# Patient Record
Sex: FEMALE | Race: WHITE | NOT HISPANIC OR LATINO | Employment: PART TIME | URBAN - METROPOLITAN AREA
[De-identification: names, ages, dates, MRNs, and addresses within clinical notes are randomized per-mention and may not be internally consistent; named-entity substitution may affect disease eponyms.]

---

## 2023-08-02 ENCOUNTER — HOSPITAL ENCOUNTER (OUTPATIENT)
Dept: MRI IMAGING | Facility: HOSPITAL | Age: 38
Discharge: HOME | End: 2023-08-02
Admitting: PSYCHIATRY & NEUROLOGY
Payer: COMMERCIAL

## 2023-08-02 DIAGNOSIS — R47.02: ICD-10-CM

## 2023-08-02 DIAGNOSIS — R41.3 MEMORY IMPAIRMENT: ICD-10-CM

## 2023-08-02 PROCEDURE — 70551 MRI BRAIN STEM W/O DYE: CPT

## 2023-10-13 ENCOUNTER — TELEPHONE (OUTPATIENT)
Dept: NEUROLOGY | Facility: CLINIC | Age: 38
End: 2023-10-13
Payer: COMMERCIAL

## 2023-10-13 DIAGNOSIS — R41.3 MEMORY IMPAIRMENT: Primary | ICD-10-CM

## 2023-10-13 DIAGNOSIS — R47.02: ICD-10-CM

## 2023-10-13 NOTE — TELEPHONE ENCOUNTER
Dr. Johnson,    Patient would like a new neuropsych referral placed to go to [facility]. She stated that [facility]y Carvalho is not in network with her insurance. I did explain that [facility] is very booked out and she voiced understanding.    Thank you,  Ashanti

## 2023-11-03 ENCOUNTER — TELEPHONE (OUTPATIENT)
Dept: NEUROLOGY | Facility: CLINIC | Age: 38
End: 2023-11-03

## 2023-11-03 ENCOUNTER — OFFICE VISIT (OUTPATIENT)
Dept: NEUROLOGY | Facility: CLINIC | Age: 38
End: 2023-11-03
Payer: COMMERCIAL

## 2023-11-03 ENCOUNTER — LAB (OUTPATIENT)
Dept: LAB | Facility: HOSPITAL | Age: 38
End: 2023-11-03
Payer: COMMERCIAL

## 2023-11-03 VITALS
HEIGHT: 67 IN | WEIGHT: 120 LBS | DIASTOLIC BLOOD PRESSURE: 72 MMHG | HEART RATE: 72 BPM | BODY MASS INDEX: 18.83 KG/M2 | SYSTOLIC BLOOD PRESSURE: 120 MMHG | OXYGEN SATURATION: 100 %

## 2023-11-03 DIAGNOSIS — R47.02: ICD-10-CM

## 2023-11-03 DIAGNOSIS — R41.3 MEMORY IMPAIRMENT: Primary | ICD-10-CM

## 2023-11-03 DIAGNOSIS — R41.3 MEMORY IMPAIRMENT: ICD-10-CM

## 2023-11-03 PROCEDURE — 99214 OFFICE O/P EST MOD 30 MIN: CPT | Performed by: PSYCHIATRY & NEUROLOGY

## 2023-11-03 PROCEDURE — 36415 COLL VENOUS BLD VENIPUNCTURE: CPT

## 2023-11-03 RX ORDER — PHENOL 1.4 %
10 AEROSOL, SPRAY (ML) MUCOUS MEMBRANE
COMMUNITY
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
## 2023-11-06 DIAGNOSIS — R41.3 MEMORY IMPAIRMENT: Primary | ICD-10-CM

## 2023-11-06 DIAGNOSIS — R47.02: ICD-10-CM

## 2024-06-27 ENCOUNTER — HOSPITAL ENCOUNTER (EMERGENCY)
Facility: HOSPITAL | Age: 39
Discharge: HOME | End: 2024-06-27
Attending: EMERGENCY MEDICINE
Payer: COMMERCIAL

## 2024-06-27 VITALS
OXYGEN SATURATION: 100 % | TEMPERATURE: 98.8 F | BODY MASS INDEX: 18.21 KG/M2 | DIASTOLIC BLOOD PRESSURE: 97 MMHG | HEART RATE: 69 BPM | RESPIRATION RATE: 14 BRPM | WEIGHT: 116 LBS | HEIGHT: 67 IN | SYSTOLIC BLOOD PRESSURE: 137 MMHG
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
## 2024-06-27 DIAGNOSIS — L98.9 SKIN LESION: Primary | ICD-10-CM

## 2024-06-27 PROCEDURE — 99283 EMERGENCY DEPT VISIT LOW MDM: CPT

## 2024-06-27 RX ORDER — DOXYCYCLINE 100 MG/1
100 CAPSULE ORAL ONCE
Status: COMPLETED | OUTPATIENT
Start: 2024-06-27 | End: 2024-06-27

## 2024-06-27 RX ORDER — DOXYCYCLINE 100 MG/1
100 CAPSULE ORAL 2 TIMES DAILY
Qty: 14 CAPSULE | Refills: 0 | Status: SHIPPED | OUTPATIENT
Start: 2024-06-27

## 2024-06-27 RX ADMIN — DOXYCYCLINE 100 MG: 100 CAPSULE ORAL at 22:00

## 2024-06-28 NOTE — DISCHARGE INSTRUCTIONS
Patient has skin lesion to the right buttocks.  With recently seeing a tick, question whether patient has a tick bite.  Exam is not consistent with abscess, cellulitis, and I do not appreciate concern for tick still being embedded.  We will cover patient with doxycycline 100 mg by mouth twice daily x 7 days.  Keep skin lesion clean with Dove or yellow antibacterial Dial.  Follow-up closely with PCP for recheck within 24 to 48 hours as needed.  Return to the ER for worsening symptoms.

## 2024-06-28 NOTE — ED PROVIDER NOTES
Subjective   History of Present Illness  This is a healthy 39-year-old female that presents the ER with questionable tick bite to the right buttocks.  Patient says there is a central scab with some minimal localized erythema.  There is no induration or drainage and no erythematous streaking to the right buttocks.  Patient denies any systemic symptoms of fever, chills, nausea/vomiting, or myalgias.  Patient and  have been dog sitting.  Patient's  picked a tick off of his leg yesterday that was embedded.  Patient did not see if a tick was on her, but after her  saw the tick and patient noticed the skin lesion to her buttocks, she wanted to be checked out.  She had a telehealth visit, and they recommended that she come to the ER for further assessment.  Patient has no significant past medical history.  Last menstrual period was 6/4/2024.  She takes routine OCPs daily.  No other concerns at this time.    History provided by:  Patient  Rash  Location:  Pelvis  Pelvic rash location:  R buttock  Quality: itchiness and redness    Quality comment:  Central scab with localized erythema, no streaking.  Patient says the lesion feels itchy and sore.  Onset quality:  Sudden  Duration: Patient noticed the lesion earlier today.  Timing:  Constant  Progression:  Unchanged  Chronicity:  New  Context: not insect bite/sting    Context comment:  Patient concerned about questionable tick bite.  She and  are dog sitting and he picked a tick off of his leg.  Erythematous lesion with central scab to the right buttocks.  No systemic symptoms.  Relieved by:  Nothing  Worsened by:  Nothing  Ineffective treatments:  None tried  Associated symptoms: no abdominal pain, no diarrhea, no fatigue, no fever, no headaches, no hoarse voice, no induration, no joint pain, no myalgias, no nausea, no periorbital edema, no shortness of breath, no sore throat, no throat swelling, no tongue swelling, no URI, not vomiting and not  wheezing        Review of Systems   Constitutional: Negative.  Negative for activity change, appetite change, chills, diaphoresis, fatigue and fever.   HENT:  Negative for hoarse voice and sore throat.    Respiratory: Negative.  Negative for shortness of breath and wheezing.    Cardiovascular: Negative.    Gastrointestinal: Negative.  Negative for abdominal pain, diarrhea, nausea and vomiting.   Genitourinary: Negative.         LMP: 6/4/24.   Musculoskeletal:  Negative for arthralgias, joint swelling and myalgias.   Skin:  Positive for rash (Questionable tick bite to the right buttocks.  Skin lesion with central scab and mild localized erythema.  No underlying induration and no erythematous streaking.). Negative for color change.   Neurological: Negative.  Negative for dizziness, syncope, weakness and headaches.   All other systems reviewed and are negative.      No past medical history on file.    Allergies   Allergen Reactions    Penicillins Rash     When she was a child       Past Surgical History:   Procedure Laterality Date    BREAST LUMPECTOMY  2014       Family History   Problem Relation Age of Onset    Cancer Mother        Social History     Socioeconomic History    Marital status:    Tobacco Use    Smoking status: Never     Passive exposure: Never    Smokeless tobacco: Never   Vaping Use    Vaping status: Never Used   Substance and Sexual Activity    Alcohol use: Yes     Comment: 3 or 4 drinks a week    Drug use: Never    Sexual activity: Defer           Objective   Physical Exam  Vitals and nursing note reviewed.   Constitutional:       General: She is not in acute distress.     Appearance: Normal appearance. She is not ill-appearing, toxic-appearing or diaphoretic.      Comments: No acute sign of pain or distress.  Nontoxic.   HENT:      Head: Normocephalic and atraumatic.      Nose: Nose normal.      Mouth/Throat:      Mouth: Mucous membranes are moist.      Pharynx: Oropharynx is clear. No  pharyngeal swelling, oropharyngeal exudate or posterior oropharyngeal erythema.      Comments: Oral mucous membranes are moist  Eyes:      Extraocular Movements: Extraocular movements intact.      Conjunctiva/sclera: Conjunctivae normal.      Pupils: Pupils are equal, round, and reactive to light.   Cardiovascular:      Rate and Rhythm: Normal rate and regular rhythm. No extrasystoles are present.     Pulses: Normal pulses.      Heart sounds: Normal heart sounds.      Comments: Regular rate and rhythm.  No ectopy  Pulmonary:      Effort: Pulmonary effort is normal.      Breath sounds: Normal breath sounds.      Comments: Lungs are clear to auscultation bilaterally  Abdominal:      General: Bowel sounds are normal.      Palpations: Abdomen is soft.   Musculoskeletal:         General: Normal range of motion.      Cervical back: Normal range of motion and neck supple.      Right lower leg: No edema.      Left lower leg: No edema.      Comments: Patient has full range of motion all 4 extremities.  No joint tenderness or warmth, erythema, or soft tissue swelling.  No tenderness to diffuse muscle groups.   Skin:     General: Skin is warm and dry.      Findings: Lesion present.      Comments: Patient has a skin lesion to the right buttocks.  There is a central scab with some minimal surrounding erythema.  No underlying induration and no central fluctuance.  No erythematous streaking.  This could be questionable tick bite versus other insect bite that is irritated.  No concern for abscess or cellulitis.   Neurological:      General: No focal deficit present.      Mental Status: She is alert.         Procedures           ED Course  ED Course as of 06/27/24 2223   Thu Jun 27, 202427, 2024 2217 Patient concerned about possible tick bite.  She has a benign skin lesion to the right buttocks that has a small central scab.  No physical exam evidence of abscess or cellulitis.  We will cover patient with doxycycline 100 mg by mouth twice  "daily x 7 days.  Recommend close PCP follow-up for recheck.  Keep lesion clean with Dove soap or yellow antibacterial Dial. [FC]      ED Course User Index  [FC] Soledad Gifford PA-C                                 No results found for this or any previous visit (from the past 24 hour(s)).  Note: In addition to lab results from this visit, the labs listed above may include labs taken at another facility or during a different encounter within the last 24 hours. Please correlate lab times with ED admission and discharge times for further clarification of the services performed during this visit.    No orders to display     Vitals:    06/27/24 2045   BP: 137/97   BP Location: Left arm   Patient Position: Sitting   Pulse: 69   Resp: 14   Temp: 98.8 °F (37.1 °C)   TempSrc: Oral   SpO2: 100%   Weight: 52.6 kg (116 lb)   Height: 170.2 cm (67\")     Medications   doxycycline (MONODOX) capsule 100 mg (100 mg Oral Given 6/27/24 2200)     ECG/EMG Results (last 24 hours)       ** No results found for the last 24 hours. **          No orders to display                   Medical Decision Making  Risk  Prescription drug management.        Final diagnoses:   Skin lesion   Tick bite, unspecified site, initial encounter       ED Disposition  ED Disposition       ED Disposition   Discharge    Condition   Stable    Comment   --               Jalen Montano MD  [address] [place] [address] [phone]    Call   Call tomorrow for close recheck as needed, As needed    [facility] EMERGENCY DEPARTMENT  [address] [facility] [address] [facility] [address] [phone]    If symptoms worsen         Medication List        New Prescriptions      doxycycline 100 MG capsule  Commonly known as: MONODOX  Take 1 capsule by mouth 2 (Two) Times a Day.               Where to Get Your Medications        These medications were sent to [facility] [place] - [address] - [phone] PH - " [phone] FX  [address] [address]      Phone: [phone]   doxycycline 100 MG capsule            Soledad Gifford PA-C  06/27/24 5838